# Patient Record
Sex: MALE | Race: WHITE | NOT HISPANIC OR LATINO
[De-identification: names, ages, dates, MRNs, and addresses within clinical notes are randomized per-mention and may not be internally consistent; named-entity substitution may affect disease eponyms.]

---
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
## 2023-05-24 NOTE — H&P PST PEDIATRIC - NS MD HP ROS SLEEP SOMNOL
_______________________________________________________________________     Return Appointment:  DME/Wound Dressing Supplies Provided by:  [facility]  (Supply companies distribute one month supply at a time. Please call them directly to reorder supplies when you run out)     ECF or Home Healthcare: Your Home Care Agency is responsible to order your supplies. Return Appointment: With Tyler Matias CNP  in 1 Week(s)                      : Antonina Canales      Electronically signed by Kevin Naylor RN on 5/24/2023 at [facility]: Should you experience any significant chnges in your wound(s) or have questions about your wound care, please contact the [address] [place] [address] at [address] 8:30 am - 4:30 pm and Friday 8:30 am - 1:00 pm.  If you need help with your wound outside these hours and cannot wait until we are again available, contact your PCP or go to the hospital emergency room. PLEASE NOTE: IF YOU ARE UNABLE TO OBTAIN WOUND SUPPLIES, CONTINUE TO USE THE SUPPLIES YOU HAVE AVAILABLE UNTIL YOU ARE ABLE TO REACH US. KEEP THE WOUND COVERED AT ALL TIMES.                   Patti Rubio             Electronically signed by MADISYN Llamas CNP on 5/24/2023 at 8:36 AM
No

## 2023-09-28 NOTE — H&P PST PEDIATRIC - SEXUAL HISTORY AND VENEREAL DISEASE
negative Cosentyx Counseling:  I discussed with the patient the risks of Cosentyx including but not limited to worsening of Crohn's disease, immunosuppression, allergic reactions and infections.  The patient understands that monitoring is required including a PPD at baseline and must alert us or the primary physician if symptoms of infection or other concerning signs are noted.

## 2025-07-14 NOTE — ASU PATIENT PROFILE, PEDIATRIC - NS CRAFFT PART A1 ALCOHOL
----- Message from Guanaco Perez MD sent at 7/12/2025  7:55 AM CDT -----  Dear Mr. Brian,  The blood tests which you had drawn recently have all been completed and reviewed.  Your hematocrit level has normalized since your recent hospitalization.  Your iron levels are also now above normal limits consistent with taking iron supplements.  I believe that you could stop your iron supplements at this time.  You should proceed with the plan for upper endoscopy on Monday as scheduled to verify that your esophagitis and ulcers have healed.    Sincerely yours,    Guanaco Perez MD  
Patient viewed results via [facility]   
No